# Patient Record
Sex: MALE | Race: WHITE | NOT HISPANIC OR LATINO | Employment: UNEMPLOYED | URBAN - METROPOLITAN AREA
[De-identification: names, ages, dates, MRNs, and addresses within clinical notes are randomized per-mention and may not be internally consistent; named-entity substitution may affect disease eponyms.]

---

## 2024-01-24 ENCOUNTER — HOSPITAL ENCOUNTER (OUTPATIENT)
Dept: RADIOLOGY | Facility: EXTERNAL LOCATION | Age: 44
Discharge: HOME | End: 2024-01-24

## 2024-02-29 ENCOUNTER — TELEPHONE (OUTPATIENT)
Dept: OPERATING ROOM | Facility: CLINIC | Age: 44
End: 2024-02-29
Payer: MEDICARE

## 2024-02-29 NOTE — TELEPHONE ENCOUNTER
I think you and I discussed this patient once before but I don't see the encounter.    5 years ago spinal compression. Has surgery, was paralyzed from chin down but can now feed himself. Also has cerebral palsy.     Foot swollen x 3 months and can't bear weight which makes transfer very difficult for mom.     He saw Rosa (I think you suggested him?) who took an xray at [facility] (mom didn't get results they just said it was complicated)  and they sent him to Luz Elena who said he couldn't see him for it who sent him to Jenni who is sending him to you.     Any suggestions?

## 2024-02-29 NOTE — TELEPHONE ENCOUNTER
"NO I dont remember this one.  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  He already is seeing PM&R / spasticity experts at [facility], and they're doing MORE that what I can do for this problem.  Dr Laguna and Jenni should not be referring to me for these when they're already seeing PM&R at [facility].     Maybe Naveed Cha at [facility] would see him?   I\"ll CC Naveed here.   Naveed?  I had to give this patient/family the run-around but I can't do any better for him the Dr June at [facility] PM&R  "

## 2024-03-13 ENCOUNTER — APPOINTMENT (OUTPATIENT)
Dept: ORTHOPEDIC SURGERY | Facility: CLINIC | Age: 44
End: 2024-03-13
Payer: MEDICARE

## 2024-05-14 ENCOUNTER — APPOINTMENT (OUTPATIENT)
Dept: ORTHOPEDIC SURGERY | Facility: CLINIC | Age: 44
End: 2024-05-14
Payer: MEDICARE

## 2025-06-24 ENCOUNTER — APPOINTMENT (OUTPATIENT)
Dept: PRIMARY CARE | Facility: CLINIC | Age: 45
End: 2025-06-24
Payer: MEDICARE

## 2025-08-07 ENCOUNTER — APPOINTMENT (OUTPATIENT)
Dept: PRIMARY CARE | Facility: CLINIC | Age: 45
End: 2025-08-07
Payer: MEDICARE

## 2025-10-07 ENCOUNTER — APPOINTMENT (OUTPATIENT)
Dept: PRIMARY CARE | Facility: CLINIC | Age: 45
End: 2025-10-07
Payer: MEDICARE